# Patient Record
Sex: FEMALE | Race: WHITE
[De-identification: names, ages, dates, MRNs, and addresses within clinical notes are randomized per-mention and may not be internally consistent; named-entity substitution may affect disease eponyms.]

---

## 2018-07-30 ENCOUNTER — HOSPITAL ENCOUNTER (INPATIENT)
Dept: HOSPITAL 74 - FM/S | Age: 43
LOS: 2 days | Discharge: HOME HEALTH | DRG: 470 | End: 2018-08-01
Attending: ORTHOPAEDIC SURGERY | Admitting: ORTHOPAEDIC SURGERY
Payer: COMMERCIAL

## 2018-07-30 VITALS — BODY MASS INDEX: 36.9 KG/M2

## 2018-07-30 DIAGNOSIS — M16.11: Primary | ICD-10-CM

## 2018-07-30 PROCEDURE — 8E0W0CZ ROBOTIC ASSISTED PROCEDURE OF TRUNK REGION, OPEN APPROACH: ICD-10-PCS | Performed by: ORTHOPAEDIC SURGERY

## 2018-07-30 PROCEDURE — 0SR90JZ REPLACEMENT OF RIGHT HIP JOINT WITH SYNTHETIC SUBSTITUTE, OPEN APPROACH: ICD-10-PCS | Performed by: ORTHOPAEDIC SURGERY

## 2018-07-30 RX ADMIN — GABAPENTIN SCH: 300 CAPSULE ORAL at 21:37

## 2018-07-30 RX ADMIN — GABAPENTIN SCH MG: 300 CAPSULE ORAL at 21:36

## 2018-07-30 RX ADMIN — ACETAMINOPHEN ONE MG: 10 INJECTION, SOLUTION INTRAVENOUS at 13:00

## 2018-07-30 RX ADMIN — DOCUSATE SODIUM,SENNOSIDES SCH TABLET: 50; 8.6 TABLET, FILM COATED ORAL at 21:37

## 2018-07-30 RX ADMIN — CELECOXIB SCH MG: 200 CAPSULE ORAL at 21:35

## 2018-07-30 RX ADMIN — KETOROLAC TROMETHAMINE SCH MG: 30 INJECTION INTRAMUSCULAR; INTRAVENOUS at 12:50

## 2018-07-30 RX ADMIN — KETOROLAC TROMETHAMINE SCH MG: 30 INJECTION INTRAMUSCULAR; INTRAVENOUS at 17:58

## 2018-07-30 RX ADMIN — OXYCODONE HYDROCHLORIDE SCH MG: 10 TABLET, FILM COATED, EXTENDED RELEASE ORAL at 21:36

## 2018-07-30 RX ADMIN — OXYCODONE HYDROCHLORIDE AND ACETAMINOPHEN SCH MG: 500 TABLET ORAL at 21:35

## 2018-07-30 RX ADMIN — ACETAMINOPHEN SCH MG: 325 TABLET ORAL at 20:09

## 2018-07-30 NOTE — HP
Admitting History and Physical





- Admission


Chief Complaint: right hip osteoarthritis x years


History of Present Illness: 


43 year old female presents regarding her right hip. Longstanding history of 

right hip osteoarthritis. Patient complains of pain, limited ROM, difficulty 

ambulating and difficulty with ADLs. Patient has failed conservative treatment 

options including PO medication, activity modifications, injections and 

exercise program. At this point, patient wishes to proceed with surgical 

intervention - right hip arthroplasty (MAKOplasty).





History Source: Patient


Limitations to Obtaining History: No Limitations





- Past Medical History


...LMP: 10/13/16


...Pregnant: No





- Past Surgical History


Additional Past Surgical History: 


Right knee arthroscopy x 2


See written history & physical.








- Smoking History


Smoking history: Former smoker


Have you smoked in the past 12 months: No


Aproximately how many cigarettes per day: 0





- Alcohol/Substance Use


Hx Alcohol Use: Yes (SOCIALLY)





Home Medications





- Allergies


Allergies/Adverse Reactions: 


 Allergies











Allergy/AdvReac Type Severity Reaction Status Date / Time


 


No Known Drug Allergies Allergy   Verified 11/08/16 13:09














- Home Medications


Home Medications: 


Ambulatory Orders





Cholecalciferol (Vitamin D3) [Vitamin D3] 5,000 unit PO DAILY 07/12/18 


Cyanocobalamin [Vitamin B12 -] 1,000 mcg PO DAILY 07/12/18 


Liraglutide [Saxenda] 3 mg SQ DAILY 07/12/18 


Turmeric [Curcumin] 1,000 tab PO DAILY 07/12/18 











Review of Systems





- Review of Systems


Musculoskeletal: reports: Decreased ROM (right hip), Joint Pain (right hip)





Physical Examination


Vital Signs: 


 Vital Signs











Temperature  98.1 F   07/30/18 06:59


 


Pulse Rate  92 H  07/30/18 06:59


 


Respiratory Rate  16   07/30/18 06:59


 


Blood Pressure  113/72   07/30/18 06:59


 


O2 Sat by Pulse Oximetry (%)      











Constitutional: Yes: Well Nourished, No Distress


Eyes: Yes: Conjunctiva Clear


HENT: Yes: Atraumatic, Normocephalic


Neck: Yes: Supple


Cardiovascular: Yes: Regular Rate and Rhythm


Respiratory: Yes: Regular


Gastrointestinal: Yes: Soft


...Rectal Exam: Yes: Deferred


Musculoskeletal: Yes: Joint Stiffness (right hip), Joint Swelling (right hip)





Assessment/Plan


43 year old female presents regarding her right hip. Longstanding history of 

right hip osteoarthritis. Patient complains of pain, limited ROM, difficulty 

ambulating and difficulty with ADLs. Patient has failed conservative treatment 

options including PO medication, activity modifications, injections and 

exercise program. At this point, patient wishes to proceed with surgical 

intervention - right hip arthroplasty (MAKOplasty). Pros, cons, risks, benefits 

and alternatives of a right total hip arthroplasty (MAKOplasty), was discussed 

with the patient at length. Patient confirms her understanding and consents to 

proceed with a right total hip arthroplasty (MAKOplasty).

## 2018-07-30 NOTE — OP
Operative Note





- Note:


Operative Date: 07/30/18


Pre-Operative Diagnosis: R hip OA


Operation: Right BHUMIKA


Post-Operative Diagnosis: Same as Pre-op


Surgeon: Abdi Denise


Assistant: Yina Jimenez


Anesthesia: Spinal


Estimated Blood Loss (mls): 200

## 2018-07-31 LAB
ANION GAP SERPL CALC-SCNC: 4 MMOL/L (ref 8–16)
BUN SERPL-MCNC: 16 MG/DL (ref 7–18)
CALCIUM SERPL-MCNC: 8.6 MG/DL (ref 8.4–10.2)
CHLORIDE SERPL-SCNC: 104 MMOL/L (ref 98–107)
CO2 SERPL-SCNC: 24 MMOL/L (ref 22–28)
CREAT SERPL-MCNC: 0.8 MG/DL (ref 0.6–1.3)
DEPRECATED RDW RBC AUTO: 12.4 % (ref 11.6–15.6)
GLUCOSE SERPL-MCNC: 141 MG/DL (ref 74–106)
HCT VFR BLD CALC: 35.6 % (ref 32.4–45.2)
HGB BLD-MCNC: 12.4 GM/DL (ref 10.7–15.3)
MCH RBC QN AUTO: 33.3 PG (ref 25.7–33.7)
MCHC RBC AUTO-ENTMCNC: 34.9 G/DL (ref 32–36)
MCV RBC: 95.5 FL (ref 80–96)
PLATELET # BLD AUTO: 258 K/MM3 (ref 134–434)
PMV BLD: 9.3 FL (ref 7.5–11.1)
POTASSIUM SERPLBLD-SCNC: 4.5 MMOL/L (ref 3.5–5.1)
RBC # BLD AUTO: 3.73 M/MM3 (ref 3.6–5.2)
SODIUM SERPL-SCNC: 132 MMOL/L (ref 136–145)
WBC # BLD AUTO: 17.4 K/MM3 (ref 4–10.8)

## 2018-07-31 RX ADMIN — CELECOXIB SCH MG: 200 CAPSULE ORAL at 22:12

## 2018-07-31 RX ADMIN — OXYCODONE HYDROCHLORIDE AND ACETAMINOPHEN SCH MG: 500 TABLET ORAL at 10:55

## 2018-07-31 RX ADMIN — ACETAMINOPHEN SCH: 325 TABLET ORAL at 14:06

## 2018-07-31 RX ADMIN — CELECOXIB SCH MG: 200 CAPSULE ORAL at 10:55

## 2018-07-31 RX ADMIN — ACETAMINOPHEN SCH MG: 325 TABLET ORAL at 22:10

## 2018-07-31 RX ADMIN — GABAPENTIN SCH: 300 CAPSULE ORAL at 10:57

## 2018-07-31 RX ADMIN — OXYCODONE HYDROCHLORIDE AND ACETAMINOPHEN SCH MG: 500 TABLET ORAL at 22:11

## 2018-07-31 RX ADMIN — GABAPENTIN SCH MG: 300 CAPSULE ORAL at 10:55

## 2018-07-31 RX ADMIN — KETOROLAC TROMETHAMINE SCH: 30 INJECTION INTRAMUSCULAR; INTRAVENOUS at 14:20

## 2018-07-31 RX ADMIN — ACETAMINOPHEN SCH MG: 325 TABLET ORAL at 01:37

## 2018-07-31 RX ADMIN — ASPIRIN 325 MG ORAL TABLET SCH MG: 325 PILL ORAL at 08:52

## 2018-07-31 RX ADMIN — CEFAZOLIN SODIUM SCH: 2 SOLUTION INTRAVENOUS at 14:20

## 2018-07-31 RX ADMIN — PANTOPRAZOLE SODIUM SCH MG: 40 TABLET, DELAYED RELEASE ORAL at 10:55

## 2018-07-31 RX ADMIN — DOCUSATE SODIUM,SENNOSIDES SCH TABLET: 50; 8.6 TABLET, FILM COATED ORAL at 10:55

## 2018-07-31 RX ADMIN — GABAPENTIN SCH: 300 CAPSULE ORAL at 22:12

## 2018-07-31 RX ADMIN — CEFAZOLIN SODIUM SCH MLS/HR: 2 SOLUTION INTRAVENOUS at 01:37

## 2018-07-31 RX ADMIN — ACETAMINOPHEN SCH MG: 325 TABLET ORAL at 08:52

## 2018-07-31 RX ADMIN — DOCUSATE SODIUM,SENNOSIDES SCH TABLET: 50; 8.6 TABLET, FILM COATED ORAL at 22:11

## 2018-07-31 RX ADMIN — KETOROLAC TROMETHAMINE SCH MG: 30 INJECTION INTRAMUSCULAR; INTRAVENOUS at 06:40

## 2018-07-31 RX ADMIN — KETOROLAC TROMETHAMINE SCH MG: 30 INJECTION INTRAMUSCULAR; INTRAVENOUS at 00:22

## 2018-07-31 RX ADMIN — ACETAMINOPHEN ONE: 10 INJECTION, SOLUTION INTRAVENOUS at 14:18

## 2018-07-31 RX ADMIN — OXYCODONE HYDROCHLORIDE SCH MG: 10 TABLET, FILM COATED, EXTENDED RELEASE ORAL at 10:59

## 2018-07-31 RX ADMIN — OXYCODONE HYDROCHLORIDE SCH MG: 10 TABLET, FILM COATED, EXTENDED RELEASE ORAL at 22:11

## 2018-07-31 RX ADMIN — MULTIVITAMIN TABLET SCH TAB: TABLET at 10:55

## 2018-07-31 RX ADMIN — GABAPENTIN SCH MG: 300 CAPSULE ORAL at 22:11

## 2018-07-31 NOTE — PN
Progress Note, Physician


Chief Complaint: 





s/p right total hip arthroplasty under spinal anesthesia


History of Present Illness: 





paravertebral block for post op pain control





- Current Medication List


Current Medications: 


Active Medications





Acetaminophen (Tylenol -)  650 mg PO Q6H [facility]


   Stop: 08/02/18 19:59


   Last Admin: 07/31/18 08:52 Dose:  650 mg


Al Hydroxide/Mg Hydroxide (Mylanta Oral Suspension -)  30 ml PO Q4H PRN


   PRN Reason: DYSPEPSIA


Ascorbic Acid (Vitamin C -)  500 mg PO BID [facility]


   Last Admin: 07/30/18 21:35 Dose:  500 mg


Aspirin (Asa -)  325 mg PO DAILY@0800 [facility]


   Last Admin: 07/31/18 08:52 Dose:  325 mg


Celecoxib (Celebrex -)  200 mg PO BID [facility]


   Last Admin: 07/30/18 21:35 Dose:  200 mg


Fentanyl (Sublimaze Injection -)  50 mcg IVPUSH [id number] PRN


   PRN Reason: PAIN-PACU ORDER X 4 DOSES ONLY


Gabapentin (Neurontin -)  300 mg PO BID [facility]


   Stop: 08/02/18 21:59


   Last Admin: 07/30/18 21:36 Dose:  300 mg


Gabapentin (Neurontin -)  300 mg PO BID [facility]


   Last Admin: 07/30/18 21:37 Dose:  Not Given


Magnesium Hydroxide (Milk Of Magnesia -)  30 ml PO PRN PRN


   PRN Reason: CONSTIPATION


Multivitamins/Minerals/Vitamin C (Tab-A-Vit -)  1 tab PO DAILY [facility]


Non-Formulary Medication (Liraglutide [Saxenda])  3 mg SQ DAILY [facility]


Ondansetron HCl (Zofran Injection)  4 mg IVPUSH Q6H PRN


   PRN Reason: NAUSEA


Oxycodone HCl (Roxicodone -)  5 mg PO Q3H PRN


   PRN Reason: PAIN LEVEL 1-5


Oxycodone HCl (Roxicodone -)  10 mg PO Q3H PRN


   PRN Reason: PAIN LEVEL 6-10


   Last Admin: 07/31/18 08:51 Dose:  10 mg


Oxycodone HCl (Oxycontin -)  10 mg PO BID [facility]


   Stop: 08/02/18 15:16


   Last Admin: 07/30/18 21:36 Dose:  10 mg


Pantoprazole Sodium (Protonix -)  40 mg PO DAILY [facility]


Senna/Docusate Sodium (Pericolace -)  2 tablet PO BID [facility]


   Last Admin: 07/30/18 21:37 Dose:  2 tablet


Tramadol HCl (Ultram -)  50 mg PO Q6H [facility]


   Last Admin: 07/31/18 03:24 Dose:  50 mg











- Objective


Vital Signs: 


 Vital Signs











Temperature  98.8 F   07/31/18 10:00


 


Pulse Rate  86   07/31/18 10:00


 


Respiratory Rate  18   07/31/18 10:00


 


Blood Pressure  116/65   07/31/18 10:00


 


O2 Sat by Pulse Oximetry (%)  98   07/31/18 10:00











Constitutional: Yes: Well Nourished


Cardiovascular: Yes: WNL


Respiratory: Yes: WNL


Gastrointestinal: Yes: WNL


Labs: 


 CBC, BMP





 07/31/18 07:15 





 07/31/18 07:15 











Assessment/Plan





No adverse effects from anesthetic, no further intervention from dept of 

anesthesia at this time.

## 2018-07-31 NOTE — PN
Progress Note (short form)





- Note


Progress Note: 





Pt seen and examined.  Doing well.  Developed blisters posterior thigh from ice 

pack.





AVSS





 Selected Entries











  07/31/18





  14:00


 


Temperature 98.0 F


 


Pulse Rate 87


 


Respiratory 18





Rate 


 


Blood Pressure 127/70


 


O2 Sat by Pulse 100





Oximetry (%) 


 


Oxygen Delivery Room Air





Method 








 Laboratory Tests











  07/31/18 07/31/18





  07:15 07:15


 


WBC  17.4 H 


 


Hgb  12.4 


 


Hct  35.6 


 


Plt Count  258 


 


Sodium   132 L


 


Potassium   4.5


 


Chloride   104


 


Carbon Dioxide   24


 


Anion Gap   4 L


 


BUN   16


 


Creatinine   0.8


 


Creat Clearance w eGFR   > 60


 


Random Glucose   141 H


 


Calcium   8.6











Gen: NAD





RLE:  c/d/i, NVID.  Redness posterior thigh with 3 fluid filled blisters.





A/P s/p R BHUMIKA





Blisters drained and dry dressing with bacitracin ointment placed.





D/C home in AM after PT

## 2018-07-31 NOTE — DS
Physical Examination


Vital Signs: 


 Vital Signs











Temperature  98.0 F   07/31/18 14:00


 


Pulse Rate  87   07/31/18 14:00


 


Respiratory Rate  18   07/31/18 14:00


 


Blood Pressure  127/70   07/31/18 14:00


 


O2 Sat by Pulse Oximetry (%)  100   07/31/18 20:29











Labs: 


 CBC, BMP





 07/31/18 07:15 





 07/31/18 07:15 











Discharge Summary


Reason For Visit: OSTEOARTHRITIS RIGHT HIP


Current Active Problems





Osteoarthritis of right hip (Acute)








Procedures: Principal: Right total hip replacement


Hospital Course: 





Admitted for elective surgery. Procedure performed without complications. 


Pt received postoperative antibiotic prophylaxis and DVT ppx.


Ambulated with physical therapy. Stable for discharge home with outpatient 

followup.


Condition: Stable





- Instructions


Diet, Activity, Other Instructions: 


Dr Denise - Hip Replacement Instructions





Keep the Aquacel dressing on until removed by Dr. Denise in 10-14 days - 


it is antibacterial and waterproof and you can shower with it on.





Call the office for a follow-up appointment with Dr. Denise in 10-14 days. ~[phone]





Take one Aspirin 325mg daily for 6 weeks to prevent blood clots in your legs.





Take one Pantoprazole 40mg daily for 6 weeks to protect against heartburn and 

ulcers.





Take Cephalexin (antibiotic) 3x/day for 10 days to help prevent skin infection.





Take Celebrex 200mg twice daily for 30 days to reduce swelling and inflammation.





Take a multivitamin, stool softener and extra Vitamin C supplement daily.





For pain:





*Mild pain (1-3/10):





Take 1 Tramadol tablet every 4 hours as needed.





**Moderate pain (4-6/10):





Take 1 Tramadol tablet and 1 Percocet tablet every 4 hours as needed.





*** Severe pain (7-10/10):





Take 1 Tramadol tablet and 2 Percocet tablets every 4 hours as needed.





Activity: 


You can put as much weight on the operative leg as you want.





For the first 6 weeks, all you need to do is walk around the house, 


go up/down stairs, and sit down/get up. 





After 6 weeks when everything is healed (and bone has grown into the implant) 


you will be sent for more intensive outpatient physical therapy.





Always use a walker or cane for balance and to prevent falls.





Expect to see swelling / bruising from the operative site all the way down


to your toes.





Wear the Compression stocking on the operative side during the day to minimize 

how much


swelling there is in your foot/ankle.


Don't wear the stocking at night. You don't have to wear the stocking on the 

other side.


Disposition: VNS/HOME HEALTH CARE





- Home Medications


Comprehensive Discharge Medication List: 


Ambulatory Orders





Cholecalciferol (Vitamin D3) [Vitamin D3] 5,000 unit PO DAILY 07/12/18 


Cyanocobalamin [Vitamin B12 -] 1,000 mcg PO DAILY 07/12/18 


Liraglutide [Saxenda] 3 mg SQ DAILY 07/12/18 


Turmeric [Curcumin] 1,000 tab PO DAILY 07/12/18 


Ascorbic Acid [Vitamin C -] 500 mg PO BID  tablet 07/31/18 


Aspirin [ASA -] 325 mg PO DAILY@0800  tablet 07/31/18 


Celecoxib [CeleBREX -] 200 mg PO BID #60 capsule 07/31/18 


Cephalexin Monohydrate [Keflex -] 500 mg PO TID #30 capsule 07/31/18 


Multivitamins [Multivit ([facility] Formulary)] 1 tab PO DAILY  tab 07/31/18 


Oxycodone HCl/Acetaminophen [Percocet 5-325 mg Tablet] 1 - 2 tab PO Q4H PRN #60 

tablet MDD 10 07/31/18 


Pantoprazole Sodium [Protonix -] 40 mg PO DAILY #40 tablet.ec 07/31/18 


Sennosides/Docusate Sodium [Pericolace -] 2 tablet PO BID  tablet 07/31/18 


traMADol HCL [Ultram -] 50 mg PO Q4H PRN #42 tablet MDD 6 07/31/18

## 2018-08-01 VITALS — TEMPERATURE: 98.2 F | SYSTOLIC BLOOD PRESSURE: 127 MMHG | DIASTOLIC BLOOD PRESSURE: 65 MMHG | HEART RATE: 90 BPM

## 2018-08-01 LAB
DEPRECATED RDW RBC AUTO: 12.8 % (ref 11.6–15.6)
HCT VFR BLD CALC: 33.1 % (ref 32.4–45.2)
HGB BLD-MCNC: 11.1 GM/DL (ref 10.7–15.3)
MCH RBC QN AUTO: 32.1 PG (ref 25.7–33.7)
MCHC RBC AUTO-ENTMCNC: 33.5 G/DL (ref 32–36)
MCV RBC: 95.8 FL (ref 80–96)
PLATELET # BLD AUTO: 227 K/MM3 (ref 134–434)
PMV BLD: 8.7 FL (ref 7.5–11.1)
RBC # BLD AUTO: 3.45 M/MM3 (ref 3.6–5.2)
WBC # BLD AUTO: 13.6 K/MM3 (ref 4–10.8)

## 2018-08-01 RX ADMIN — DOCUSATE SODIUM,SENNOSIDES SCH TABLET: 50; 8.6 TABLET, FILM COATED ORAL at 09:17

## 2018-08-01 RX ADMIN — OXYCODONE HYDROCHLORIDE SCH MG: 10 TABLET, FILM COATED, EXTENDED RELEASE ORAL at 09:18

## 2018-08-01 RX ADMIN — ACETAMINOPHEN SCH MG: 325 TABLET ORAL at 08:41

## 2018-08-01 RX ADMIN — MULTIVITAMIN TABLET SCH TAB: TABLET at 09:17

## 2018-08-01 RX ADMIN — CELECOXIB SCH MG: 200 CAPSULE ORAL at 09:17

## 2018-08-01 RX ADMIN — GABAPENTIN SCH MG: 300 CAPSULE ORAL at 09:17

## 2018-08-01 RX ADMIN — PANTOPRAZOLE SODIUM SCH MG: 40 TABLET, DELAYED RELEASE ORAL at 09:17

## 2018-08-01 RX ADMIN — OXYCODONE HYDROCHLORIDE AND ACETAMINOPHEN SCH MG: 500 TABLET ORAL at 09:17

## 2018-08-01 RX ADMIN — ASPIRIN 325 MG ORAL TABLET SCH MG: 325 PILL ORAL at 08:40

## 2018-08-01 RX ADMIN — ACETAMINOPHEN SCH: 325 TABLET ORAL at 02:00

## 2018-08-01 NOTE — PATH
Surgical Pathology Report



Patient Name:  DALTON MANDUJANO

Accession #:  [id number]

Med. Rec. #:  [id number]                                                        

   /Age/Gender:  1975 (Age: 43) / F

Account:  [id number]                                                          

             Location: [facility]

Taken:  2018

Received:  2018

Reported:  2018

Physicians:  Abdi Denise M.D.

  



Specimen(s) Received

 RIGHT FEMORAL HEAD 





Clinical History

Right hip osteoarthritis 







Final Diagnosis

BONE, FEMORAL HEAD, RIGHT, TOTAL HIP REPLACEMENT MAKOPLASTY:

DEGENERATIVE JOINT DISEASE.





***Electronically Signed***

Dalton Holley M.D.





Gross Description

Received in formalin labeled "right femoral head," is a 6.0 x 4.0 x 4.0 cm

femoral head. There are areas of eburnation present. The remaining articular

surfaces are tan-yellow and granular. The underlying trabecular bone is yellow

and hard. Representative sections are submitted in one cassette, following

decalcification.

FEMI/2018



rossy/[email or web address]

## 2018-08-02 NOTE — SPEC
DATE OF OPERATION:  07/30/2018

 

PREOPERATIVE DIAGNOSIS:  Right hip osteoarthritis.

 

POSTOPERATIVE DIAGNOSIS:  Right hip osteoarthritis.

 

PROCEDURE:  Right total hip replacement with MAKOplasty robotic navigation.

 

ATTENDING:  Yessica Lorenz MD

 

ASSISTANT:  ÓSCAR Mijares

 

ANESTHESIA:  Spinal plus sedation.

 

ESTIMATED BLOOD LOSS:  200 mL

 

COMPLICATIONS:  None.

 

DISPOSITION:  The patient was transferred to the PACU in stable condition.

 

IMPLANTS USED:  Erin Accolade size 3 femoral component, Erin PSL 50-mm

acetabular component with acetabular screws, MDM bipolar head ball with inner ceramic

28-mm plus 4-mm offset head ball.

 

INDICATIONS:  This is a 43-year-old female who presented with severe right hip pain. 

She was seen and examined by Dr. Lorenz and diagnosed with severe right hip

osteoarthritis.  The patient was initially treated conservatively but continued to

have severe pain and ambulatory dysfunction.  She was, therefore, indicated for a

right total hip replacement.  The risks, benefits, and alternatives to the surgery

were explained to the patient in great detail, and she elected to proceed with the

procedure.

 

On the day of surgery, the patient was taken to the operating room and placed on the

OR table.  Spinal anesthesia was administered by the anesthesiologist.  The patient

was then positioned in the lateral decubitus position on the table and all bony

prominences were padded.  An axillary roll was placed.  The operative hip was then

prepped and draped in the usual sterile fashion and intravenous antibiotics were

given for infection prophylaxis.  A surgical time-out was then performed with the

team, and the patients identity, procedure, side, availability of implants, and the

administration of antibiotics were confirmed.  

 

An approximately 15-cm longitudinal incision was made through the skin centered on

the greater trochanter of the hip.  This dissection was carried down through the

subcutaneous tissues to the deep fascia.  This fascia was then incised and a Cobra

was placed around the inferior femoral neck.  Electrocautery was used to reflect the

anterior 40% of the gluteus medius and minimus starting at the musculotendinous

junction and leaving a cuff for closure.  This was reflected to reveal the capsule of

the hip joint.  An anterior capsulectomy was performed and the femoral head and neck

were visualized.  Grade 4 changes were noted diffusely throughout the joint.

 

At this point, three small stab incisions were made superior to the main incision

along the iliac crest.  Three self-drilling Steinmann pins were then placed and the

Moises pelvic array was attached.   Reference points on the limb were then entered into

the robotic device and the limb length deficiency, offset, and femoral neck resection

level were then calculated by the software.  The hip was then dislocated with

traction and external rotation.  An oscillating saw was used to make the femoral neck

cut at the level previously templated, and the femoral head was removed. 

 

Attention was then turned to the acetabulum.  Retractors were then placed around the

acetabulum and the labrum was removed.  An acetabular checkpoint pin and the Samba TV

software were used to register the contours of the acetabulum.  The acetabulum was

then reamed in a single stage to the preoperatively templated size using the Samba TV

robotic arm.  The appropriately sized cup was then impacted and had solid fixation as

well as the preset inclination and version of 40 and 20 degrees, respectively.  A

polyethylene liner was then placed in the cup.

 

Attention was then turned back to the femur, which was externally rotated for

improved visualization. A femoral neck elevator was used to present the femoral neck

cut, a box osteotome was used to enter the femoral canal, and a canal finder was used

to go down the femoral shaft.  The Moises broaches were used sequentially until the

optimal scratch fit was achieved.  This correlated with the preoperatively templated

size.  From here, several different offset head and neck configurations were tested

until excellent stability and length were obtained.  These measurements were

quantified using the Samba TV software.

 

All trial components were then removed, the femur was copiously irrigated, and the

final components were placed.  Leg length and stability were checked again and found

to be excellent.  Irrigation was performed again.  Wound closure was started by

repairing the abductor muscles with a no. 2 FiberWire stitch in a Krackow

configuration passed through bone tunnels in the greater trochanter and tied over a

bony bridge.  This repair was then reinforced with a 0 V-Loc 180 barbed suture. 

Next, no. 1 Polysorb and 0 V-Loc 180 were used to close the fascia.  The deep

subcutaneous tissue was closed with no. 1 Polysorb sutures, and 2-0 Polysorb was used

for the superficial subcutaneous tissue.  The skin was closed using both 3-0 V-Loc 90

suture in a running subcuticular fashion and SwiftSet skin adhesive.   The Moises array

and pins were removed from the iliac crest and the stab incision sites were irrigated

and closed with 4-0 Polysorb sutures and SwiftSet skin adhesive.  Once this was

completed, a sterile dressing was applied.  The patient was then awakened and taken

to the PACU in stable condition.

 

ADDENDUM:  After final implants were placed, a 3-minute dilute Betadine lavage was

performed.  Following this, the wound was thoroughly irrigated with normal saline via

pulsatile lavage, and wound closure was begun.

 

 

YESSICA LORENZ M.D.

 

[id number]

DD: 08/02/2018 08:14

DT: 08/02/2018 08:46

Job #:  [id number]